# Patient Record
Sex: FEMALE | URBAN - METROPOLITAN AREA
[De-identification: names, ages, dates, MRNs, and addresses within clinical notes are randomized per-mention and may not be internally consistent; named-entity substitution may affect disease eponyms.]

---
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
## 2024-04-03 NOTE — PROCEDURE: MEDICATION COUNSELING
3 4 Solaraze Pregnancy And Lactation Text: This medication is Pregnancy Category B and is considered safe. There is some data to suggest avoiding during the third trimester. It is unknown if this medication is excreted in breast milk.

## 2025-05-09 NOTE — PROCEDURE: MEDICATION COUNSELING
[phone]   Patient is requesting a 90 rx for   valACYclovir (VALTREX) 500 MG tablet .    [facility] gave her 5 pills as stated in the RX but the pt is going to be out in 2 days and [facility] can't get them out that quickly so she won't be out. She would like a 90 day rx sent to [facility] in [place], then her [facility] will pick it back up    [id number] - [place], [place] - [address]       Last seen 12.12.24  Next visit   Future Appointments   Date Time Provider Department Center   6/18/2025  1:00 PM O'E[place], MD [facility]       Birth Control Pills Counseling: Birth Control Pill Counseling: I discussed with the patient the potential side effects of OCPs including but not limited to increased risk of stroke, heart attack, thrombophlebitis, deep venous thrombosis, hepatic adenomas, breast changes, GI upset, headaches, and depression.  The patient verbalized understanding of the proper use and possible adverse effects of OCPs. All of the patient's questions and concerns were addressed.